# Patient Record
Sex: FEMALE | Race: WHITE | NOT HISPANIC OR LATINO
[De-identification: names, ages, dates, MRNs, and addresses within clinical notes are randomized per-mention and may not be internally consistent; named-entity substitution may affect disease eponyms.]

---

## 2024-05-20 ENCOUNTER — APPOINTMENT (OUTPATIENT)
Dept: ORTHOPEDIC SURGERY | Facility: CLINIC | Age: 74
End: 2024-05-20
Payer: MEDICARE

## 2024-05-20 VITALS — HEIGHT: 60 IN | WEIGHT: 150 LBS | BODY MASS INDEX: 29.45 KG/M2

## 2024-05-20 DIAGNOSIS — C80.1 MALIGNANT (PRIMARY) NEOPLASM, UNSPECIFIED: ICD-10-CM

## 2024-05-20 DIAGNOSIS — E78.00 PURE HYPERCHOLESTEROLEMIA, UNSPECIFIED: ICD-10-CM

## 2024-05-20 DIAGNOSIS — E11.9 TYPE 2 DIABETES MELLITUS W/OUT COMPLICATIONS: ICD-10-CM

## 2024-05-20 PROCEDURE — 72170 X-RAY EXAM OF PELVIS: CPT

## 2024-05-20 PROCEDURE — 99203 OFFICE O/P NEW LOW 30 MIN: CPT

## 2024-05-20 PROCEDURE — 72100 X-RAY EXAM L-S SPINE 2/3 VWS: CPT

## 2024-05-20 RX ORDER — METHYLPREDNISOLONE 4 MG/1
4 TABLET ORAL
Qty: 1 | Refills: 0 | Status: ACTIVE | COMMUNITY
Start: 2024-05-20 | End: 1900-01-01

## 2024-05-20 RX ORDER — GABAPENTIN 100 MG/1
100 CAPSULE ORAL
Qty: 60 | Refills: 0 | Status: ACTIVE | COMMUNITY
Start: 2024-05-20 | End: 1900-01-01

## 2024-05-20 NOTE — HISTORY OF PRESENT ILLNESS
[Lower back] : lower back [Gradual] : gradual [Dull/Aching] : dull/aching [Sharp] : sharp [Tingling] : tingling [Sleep] : sleep [de-identified] :  05/20/2024: 2-week history of right-sided lower back pain with radicular complaints down the posterior right leg to the calf.  She denies injury.  She was taking Advil with minimal help.  She saw her primary care physician who gave her a prescription for oxycodone with minimal help.  He sent her for a Doppler of the right lower extremity to rule out DVT which was also negative. [] : no [FreeTextEntry3] : 2 weeks  [FreeTextEntry5] : no injury, patient has been feeling pain in thigh down to the calf.

## 2024-05-20 NOTE — ASSESSMENT
[FreeTextEntry1] : - Will start her on a course of physical therapy start on gabapentin for the radicular pain  will get mri lumbar as well since she has failed other physician conservative care  f/u after mri

## 2024-05-20 NOTE — IMAGING
[FreeTextEntry1] : Multilevel degenerative changes at L2-3 significant osteophyte formation noted almost autofused, and at L5-S1 spondylolisthesis

## 2024-05-20 NOTE — PHYSICAL EXAM
[Flexion] : flexion [Extension] : extension [Bending to left] : bending to left [Bending to right] : bending to right [] : mildly antalgic

## 2024-05-24 ENCOUNTER — APPOINTMENT (OUTPATIENT)
Dept: MRI IMAGING | Facility: CLINIC | Age: 74
End: 2024-05-24
Payer: MEDICARE

## 2024-05-24 PROCEDURE — 72148 MRI LUMBAR SPINE W/O DYE: CPT

## 2024-05-28 ENCOUNTER — APPOINTMENT (OUTPATIENT)
Dept: ORTHOPEDIC SURGERY | Facility: CLINIC | Age: 74
End: 2024-05-28

## 2024-06-07 ENCOUNTER — APPOINTMENT (OUTPATIENT)
Dept: ORTHOPEDIC SURGERY | Facility: CLINIC | Age: 74
End: 2024-06-07
Payer: MEDICARE

## 2024-06-07 VITALS — HEIGHT: 60 IN | WEIGHT: 150 LBS | BODY MASS INDEX: 29.45 KG/M2

## 2024-06-07 DIAGNOSIS — M51.36 OTHER INTERVERTEBRAL DISC DEGENERATION, LUMBAR REGION: ICD-10-CM

## 2024-06-07 DIAGNOSIS — M54.16 RADICULOPATHY, LUMBAR REGION: ICD-10-CM

## 2024-06-07 DIAGNOSIS — M48.061 SPINAL STENOSIS, LUMBAR REGION WITHOUT NEUROGENIC CLAUDICATION: ICD-10-CM

## 2024-06-07 DIAGNOSIS — M43.16 SPONDYLOLISTHESIS, LUMBAR REGION: ICD-10-CM

## 2024-06-07 PROCEDURE — 99213 OFFICE O/P EST LOW 20 MIN: CPT

## 2024-06-07 NOTE — ASSESSMENT
[FreeTextEntry1] : MRI reviewed, no urgent surgical issues Just started PT, leg pain better. Will continue conservatively with PT Possibility of LESIs if leg symptoms worsen discussed

## 2024-06-07 NOTE — REASON FOR VISIT
[FreeTextEntry2] : 06/07/2024 Had MRI: multilevel ddd, bulges, grade 1 listhesis L4/5, mild to moderate stenosis L5/S1

## 2024-06-07 NOTE — HISTORY OF PRESENT ILLNESS
[Lower back] : lower back [Gradual] : gradual [Dull/Aching] : dull/aching [Sharp] : sharp [Tingling] : tingling [Sleep] : sleep [de-identified] :  05/20/2024: 2-week history of right-sided lower back pain with radicular complaints down the posterior right leg to the calf.  She denies injury.  She was taking Advil with minimal help.  She saw her primary care physician who gave her a prescription for oxycodone with minimal help.  He sent her for a Doppler of the right lower extremity to rule out DVT which was also negative. [] : no [FreeTextEntry3] : 2 weeks  [FreeTextEntry5] : no injury, patient has been feeling pain in thigh down to the calf.

## 2024-07-12 ENCOUNTER — APPOINTMENT (OUTPATIENT)
Dept: ORTHOPEDIC SURGERY | Facility: CLINIC | Age: 74
End: 2024-07-12
Payer: MEDICARE

## 2024-07-12 VITALS — HEIGHT: 60 IN | WEIGHT: 150 LBS | BODY MASS INDEX: 29.45 KG/M2

## 2024-07-12 DIAGNOSIS — M54.16 RADICULOPATHY, LUMBAR REGION: ICD-10-CM

## 2024-07-12 DIAGNOSIS — M51.36 OTHER INTERVERTEBRAL DISC DEGENERATION, LUMBAR REGION: ICD-10-CM

## 2024-07-12 DIAGNOSIS — M48.061 SPINAL STENOSIS, LUMBAR REGION WITHOUT NEUROGENIC CLAUDICATION: ICD-10-CM

## 2024-07-12 DIAGNOSIS — M43.16 SPONDYLOLISTHESIS, LUMBAR REGION: ICD-10-CM

## 2024-07-12 PROCEDURE — 99213 OFFICE O/P EST LOW 20 MIN: CPT
